# Patient Record
Sex: MALE | URBAN - METROPOLITAN AREA
[De-identification: names, ages, dates, MRNs, and addresses within clinical notes are randomized per-mention and may not be internally consistent; named-entity substitution may affect disease eponyms.]

---

## 2017-01-12 ENCOUNTER — OFFICE VISIT (OUTPATIENT)
Dept: FAMILY MEDICINE | Facility: CLINIC | Age: 32
End: 2017-01-12
Payer: COMMERCIAL

## 2017-01-12 VITALS
WEIGHT: 126 LBS | SYSTOLIC BLOOD PRESSURE: 129 MMHG | BODY MASS INDEX: 19.78 KG/M2 | TEMPERATURE: 98.4 F | HEIGHT: 67 IN | DIASTOLIC BLOOD PRESSURE: 72 MMHG | HEART RATE: 100 BPM | OXYGEN SATURATION: 97 %

## 2017-01-12 DIAGNOSIS — F31.81 BIPOLAR 2 DISORDER (H): ICD-10-CM

## 2017-01-12 DIAGNOSIS — Z79.891 ENCOUNTER FOR LONG-TERM METHADONE USE: Primary | ICD-10-CM

## 2017-01-12 DIAGNOSIS — B00.1 HERPES LABIALIS: ICD-10-CM

## 2017-01-12 PROCEDURE — 99203 OFFICE O/P NEW LOW 30 MIN: CPT | Performed by: INTERNAL MEDICINE

## 2017-01-12 PROCEDURE — 93000 ELECTROCARDIOGRAM COMPLETE: CPT | Performed by: INTERNAL MEDICINE

## 2017-01-12 RX ORDER — QUETIAPINE FUMARATE 50 MG/1
50 TABLET, FILM COATED ORAL AT BEDTIME
Qty: 30 TABLET | Refills: 5 | Status: SHIPPED | OUTPATIENT
Start: 2017-01-12

## 2017-01-12 RX ORDER — DOXYCYCLINE 100 MG/1
100 CAPSULE ORAL 2 TIMES DAILY
Qty: 20 CAPSULE | Refills: 0 | Status: SHIPPED | OUTPATIENT
Start: 2017-01-12 | End: 2017-01-12

## 2017-01-12 RX ORDER — VALACYCLOVIR HYDROCHLORIDE 1 G/1
2000 TABLET, FILM COATED ORAL 2 TIMES DAILY
Qty: 4 TABLET | Refills: 0 | Status: SHIPPED | OUTPATIENT
Start: 2017-01-12

## 2017-01-12 NOTE — PATIENT INSTRUCTIONS
This summary includes the important diagnoses, test, medications and other important parts of your medical history.  Below are a few good we sites you can use to learn more about these.     Www.ByRead.org : Up to date and easily searchable information on multiple topics.  Www.[email or web address]/c_[email or web address] : [facility] $4.99 medications  Www.medlineplus.gov : medication info, interactive tutorials, watch real surgeries online  Www.familydoctor.org : good info from the Academy of Family Physicians  Www.[employer].com : good info from the [facility]  Www.cdc.gov : public health info, travel advisories, epidemics (H1N1)  Www.aap.org : children's health info, normal development, vaccinations  Www.health.[facility].[place].us : [place] dept of heat, public health issues in [place], N1N1    Based on your medical history and these are the current health maintenance or preventive care services that you are due for (some may have been done at this visit:)  Health Maintenance Due   Topic Date Due     TETANUS IMMUNIZATION (SYSTEM ASSIGNED)  04/15/2005     INFLUENZA VACCINE (SYSTEM ASSIGNED)  09/01/2016     =================================================================================  Normal Values   Blood pressure  <140/90 for most adults    <130/80 for some chronic diseases (ask your care team about yours)    BMI (body mass index)  18.5-25 kg/m2 (based on height and weight)     Thank you for visiting [facility]    Normal or non-critical lab and imaging results will be communicated to you by MyChart, letter or phone within 7 days.  If you do not hear from us within 10 days, please call the clinic. If you have a critical or abnormal lab result, we will notify you by phone as soon as possible.     If you have any questions regarding your visit please contact:     Team Comfort:   Clinic Hours Telephone Number   Dr. Asif Reynoso    7am-5pm  Monday - Friday [phone]  [facility]   Pharmacy 8am-8pm Monday-Thursday      8am-6pm Friday  9am-5pm Saturday-Sunday [phone]   Urgent Care 11am-8pm Monday-Friday        9am-5pm Saturday-Sunday [phone]     After hours, weekend or if you need to make an appointment with your primary provider please call [phone].   After Hours nurse advise: call [facility]: [phone]    Medication Refills:  Call your pharmacy and they will forward the refill to us. Please allow 3 business days for your refills to be completed.    Use [employer] (secure email communication and access to your chart) to send your primary care provider a message or make an appointment. Ask someone on your Team how to sign up for [employer]. To log on to [employer] or for more information in [employer] please visit the website at [email or web address].  As of October 8, 2013, all password changes, disabled accounts, or ID changes in [employer]/MyHealth will be done by our Access Services Department.   If you need help with your account or password, call: [phone]. Clinic staff no longer has the ability to change passwords.

## 2017-01-12 NOTE — PROGRESS NOTES
SUBJECTIVE:                                                    Rommel Maradiaga is a 31 year old male who presents to clinic today with the following issues:    EKG REQUEST       Mr. Maradiaga takes Methadone 180 mg once daily, which receives from [facility]. To continue treatment, an EKG is necessary to monitor for prolonged QT interval. The patient denies any history of cardiovascular complaints. No family history of atherosclerotic heart disease or sudden cardiac death. No family members with a defibrillator.    BIPOLAR 2 DISORDER      The patient is also requesting Seroquel 50 mg tablets. It was first prescribed by an outside psychiatry clinic. He used to take Seroquel 300 mg once daily. Mr. Maradiaga states that Seroquel also helps him sleep at night and improves his anxiety.    ORAL LESION      He also comes in today with a persistent lesion at the left corner of his hyperlipidemia. The patient recalls that it started as two vesicles that he squeezed. He denies any purulent drainage. No history of STDs (but no prior evaluation either).      Allergies   Allergen Reactions     Penicillins      No lab results found.   BP Readings from Last 3 Encounters:   01/12/17 129/72    Wt Readings from Last 3 Encounters:   01/12/17 126 lb (57.153 kg)                    ROS:  C: NEGATIVE for fever, chills, change in weight  INTEGUMENTARY/SKIN: As above.  E: NEGATIVE for vision changes or irritation  E/M: NEGATIVE for ear, mouth and throat problems  R: NEGATIVE for significant cough or SOB  CV: NEGATIVE for chest pain, palpitations or peripheral edema  GI: NEGATIVE for nausea, abdominal pain, heartburn, or change in bowel habits  : NEGATIVE for frequency, dysuria, or hematuria  M: NEGATIVE for significant arthralgias or myalgia  N: NEGATIVE for weakness, dizziness or paresthesias  E: NEGATIVE for temperature intolerance, skin/hair changes  H: NEGATIVE for bleeding problems  P: NEGATIVE for changes in mood or  "affect    OBJECTIVE:                                                    /72 mmHg  Pulse 100  Temp(Src) 98.4  F (36.9  C) (Oral)  Ht 5' 6.5\" (1.689 m)  Wt 126 lb (57.153 kg)  BMI 20.03 kg/m2  SpO2 97%  Body mass index is 20.03 kg/(m^2).  GENERAL: healthy, alert and no distress  EYES: Eyes grossly normal to inspection  RESP: lungs clear to auscultation - no rales, rhonchi or wheezes  CV: regular rate and rhythm, normal S1 S2, no S3 or S4, no murmur, click or rub, no peripheral edema and peripheral pulses strong  MS: no gross musculoskeletal defects noted, no edema  SKIN: Excoriation noted at left oral commisurre.  NEURO: Normal strength and tone, mentation intact and speech normal  PSYCH: mentation appears normal, affect normal/bright    Diagnostic Test Results:  12-lead EKG shows normal sinus rhythm, QTc interval of 422 milliseconds, CO interval 144 milliseconds and biatrial enlargement.     ASSESSMENT/PLAN:                                                            (Z79.891) Encounter for long-term methadone use  (primary encounter diagnosis)  Comment: EKG results explained to the patient. No contraindications to Methadone due to normal QTc interval. I explained to the patient to be wary of any new medications that has QT prolongation potential.  Plan: EKG 12-lead complete w/read - Clinics            (F31.81) Bipolar 2 disorder (H)  Comment: Rx for Seroquel 50 mg sent to his pharmacy. Avoid benzodiazepines due to use of Methadone.  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  Plan: valACYclovir (VALTREX) 2000 mg BID every 12 hours x 2 doses o nly.                      FUTURE APPOINTMENTS:       - Follow-up appointment as needed if perioral lesion does not improve.    [place] MD  [facility]    "

## 2017-01-12 NOTE — NURSING NOTE
"Chief Complaint   Patient presents with     Other     need EKG due to take Methadone       Initial /72 mmHg  Pulse 100  Temp(Src) 98.4  F (36.9  C) (Oral)  Ht 5' 6.5\" (1.689 m)  Wt 126 lb (57.153 kg)  BMI 20.03 kg/m2  SpO2 97% Estimated body mass index is 20.03 kg/(m^2) as calculated from the following:    Height as of this encounter: 5' 6.5\" (1.689 m).    Weight as of this encounter: 126 lb (57.153 kg).  BP completed using cuff size: gopal Garcia MA      "